# Patient Record
Sex: MALE | Race: WHITE
[De-identification: names, ages, dates, MRNs, and addresses within clinical notes are randomized per-mention and may not be internally consistent; named-entity substitution may affect disease eponyms.]

---

## 2019-09-03 ENCOUNTER — HOSPITAL ENCOUNTER (EMERGENCY)
Dept: HOSPITAL 25 - UCCORT | Age: 69
Discharge: HOME HEALTH | End: 2019-09-03
Payer: COMMERCIAL

## 2019-09-03 VITALS — DIASTOLIC BLOOD PRESSURE: 68 MMHG | SYSTOLIC BLOOD PRESSURE: 110 MMHG

## 2019-09-03 DIAGNOSIS — R10.12: Primary | ICD-10-CM

## 2019-09-03 PROCEDURE — G0463 HOSPITAL OUTPT CLINIC VISIT: HCPCS

## 2019-09-03 PROCEDURE — 99212 OFFICE O/P EST SF 10 MIN: CPT

## 2019-09-03 NOTE — UC
UC General HPI





- HPI Summary


HPI Summary: 





PT REPORTS HAVING A DISCOMFORT AND IS POINTING TO HIS LUQ X 1 WEEK.


HE DENIES ANY TYPE OF HX INVOLVING OVER USE OR INJURY.


IT RADIATES INTO HIS BACK.


NOTHING MAKES IT BETTER OR WORSE.


HE DENIES CP, NAUSEA, SWEATING AND IT DOES NOT CHANGE WITH EXERTION; HOWEVER, 

HE WANTS TO ENSURE IT IS NOT HIS HEART.


NO N/V/D OR DYSURIA AND NO CHANGES WITH MEALS.





- History of Current Complaint


Chief Complaint: UCAbdominalPain


Stated Complaint: LEFT SIDE PAIN


Time Seen by Provider: 09/03/19 10:30


Hx Obtained From: Patient


Timing: Constant


Pain Intensity: 4


Associated Signs & Symptoms: Negative: Cough, Chest Pain, SOB





- Allergy/Home Medications


Allergies/Adverse Reactions: 


 Allergies











Allergy/AdvReac Type Severity Reaction Status Date / Time


 


No Known Allergies Allergy   Verified 09/03/19 10:21














PMH/Surg Hx/FS Hx/Imm Hx


Endocrine History: Dyslipidemia


Cardiovascular History: Cardiac Disease





- Surgical History


Surgical History: Yes


Surgery Procedure, Year, and Place: 2 cardiac stents.  heel surgery; back 4/14





- Family History


Known Family History: Positive: None





- Social History


Alcohol Use: None


Substance Use Type: None


Smoking Status (MU): Never Smoked Tobacco





Review of Systems


All Other Systems Reviewed And Are Negative: Yes


Constitutional: Negative: Fever, Chills


Skin: Negative: Rash


Respiratory: Negative: Shortness Of Breath, Cough


Cardiovascular: Negative: Palpitations, Chest Pain


Gastrointestinal: Positive: Abdominal Pain.  Negative: Vomiting, Diarrhea, 

Nausea


Genitourinary: Negative: Dysuria





Physical Exam


Triage Information Reviewed: Yes


Appearance: Well-Appearing


Vital Signs: 


 Initial Vital Signs











Temp  97.7 F   09/03/19 10:12


 


Pulse  57   09/03/19 10:12


 


Resp  16   09/03/19 10:12


 


BP  110/68   09/03/19 10:12


 


Pulse Ox  99   09/03/19 10:12











Vital Signs Reviewed: Yes


Eyes: Positive: Conjunctiva Clear


ENT: Positive: Normal ENT inspection


Neck: Positive: Supple, Nontender, No Lymphadenopathy


Respiratory: Positive: Lungs clear, Normal breath sounds, No respiratory 

distress, Other: - CHEST HAS NO RASH


Cardiovascular: Positive: RRR, No Murmur, Pulses Normal - BUE'S


Abdomen Description: Positive: No Organomegaly, Soft, Other: - NO RASH. TENDER 

LUQ.  Negative: Bruit, CVA Tenderness (R), CVA Tenderness (L), Distended, 

Guarding, Pulsatile Mass


Bowel Sounds: Positive: Present


Musculoskeletal: Positive: ROM Intact


Neurological: Positive: Alert


Psychological: Positive: Age Appropriate Behavior


Skin Exam: Normal


Skin: Negative: Rashes





Course/Dx





- Course


Course Of Treatment: 





PT DECLINING EKG CITING WILL HAVE IT DONE BY THE ER.





REPORT GIVEN TO DEO FAJARDO AT [facility]. ADVISED OF LUQ PAIN INTO 

BACK. DRIVING SELF. DECLINED EKG HERE.





- Differential Dx - Multi-Symptom


Differential Diagnoses: Other - DOUBT CARDIAC BUT STILL POSSIBLE. LOW CONCERN 

FOR PUMONARY ISSUE. COULD BE HEPATOBILIARY, PANCREATIC OR BOWEL AS WELL. DOUBT 

URINARY. PT AGREES TO ER TRANSFER FOR EVALUATION. HE WANTS TO DRIVE HIMSELF.





- Diagnoses


Provider Diagnosis: 


 LUQ discomfort








Discharge ED





- Sign-Out/Discharge


Documenting (check all that apply): Patient Departure


All imaging exams completed and their final reports reviewed: No Studies





- Discharge Plan


Condition: Stable


Disposition: TRANS HIGHER LVL OF CARE FAC


Referrals: 


Feliz Jiang MD [Primary Care Provider] - 


Additional Instructions: 


LEAVE HERE AND GO DIRECTLY TO THE [facility] ER AS DISCUSSED





- Billing Disposition and Condition


Condition: STABLE


Disposition: Trans Higher Lvl of Care Fac

## 2019-09-03 NOTE — XMS REPORT
Continuity of Care Document (CCD)

 Created on:2019



Patient:Az Solares

Sex:Male

:1950

External Reference #:[id number]





Demographics







 Address  [address] [place] [address]

 

 Mobile Phone  [phone]

 

 Preferred Language  en

 

 Marital Status  Declined to Specify/Unknown

 

 Mu-ism Affiliation  Unknown

 

 Race  White

 

 Ethnic Group  Declined to Specify/Unknown









Author







 Name  Feliz Jiang (transmitted by agent of provider Jyoti Whaley)

 

 Address  [address] [place] [address]









Problems







 Active Problems  Provider  Date

 

 Congenital insufficiency of mitral valve  Feliz Jiang  Onset: 01/10/2006

 

 Hyperlipidemia  Feliz Jiang  Onset: 01/10/2006

 

 Mitral valve disorder  Feliz Jiang  Onset: 01/10/2006

 

 Palpitations  Feliz Jiang  Onset: 01/10/2006

 

 Carotid artery occlusion  Feliz Jiang  Onset: 01/10/2006

 

 Chest pain  Feliz Jiang  Onset: 10/24/2006







Social History







 Type  Date  Description  Comments

 

 Birth Sex    Unknown  

 

 Tobacco Use  Start: Unknown  Denies Smoking  

 

 ETOH Use    Denies alcohol use  

 

 Recreational Drug Use    Denies Drug Use  

 

 Tobacco Use  Start: Unknown  Patient has never smoked  

 

 Smoking Status  Reviewed: 17  Patient has never smoked  







Allergies, Adverse Reactions, Alerts







 Description

 

 No Known Drug Allergies







Medications







 Active Medications  SIG  Qnty  Indications  Ordering Provider  Date

 

 Vitamin D3  1 by mouth  100caps    Feliz Jiang  2016



          5000Unit  every day        



 Capsules          



           

 

 Calcium 600 High  1 po bid  100tabs    Feliz Jiang  2016



 Potency          



       600mg Tablets          



           

 

 Aspir-Low  1 by mouth  100tabs    Feliz Jiang  2016



         81mg Tablets  every day        



 DR          

 

 Lipitor  1 PO Q Day  90tabs    Feliz Jiang  2012



       40mg Tablets          



           

 

 Carvedilol  1 po bid  180tabs    Feliz Jiang  2010



          3.125mg          



 Tablets          



           

 

 Tums  prn      Feliz Jiang  2006



    200mg Chewtabs          



           







Immunizations







 CPT Code  Status  Date  Vaccine  Lot #

 

   Given  2017  Influenza Virus (Afluria) Split Virus 3 Years Of  



       Age And Older  

 

 13059  Given  2016  Pneumococcal Conjugate Vaccine 13 Valent For  [id number]



       Intramuscular Use  

 

 46651  Given  2006  Tetanus And Diptheria Toxiods  

 

 99891  Given  2006  DT Immunization DIP/Tet (History Only)  [id number]

 

 37933  Given  2004  Tetanus And Diptheria Toxiods  







Vital Signs







 Date  Vital  Result  Comment

 

 2019 10:53am  BP Systolic  112 mmHg  









 BP Diastolic  62 mmHg  

 

 Heart Rate  52 /min  

 

 Height  67 inches  5'7"

 

 Weight  204.00 lb  

 

 BMI (Body Mass Index)  31.9 kg/m2  

 

 O2 % BldC Oximetry  96 %  









 2019 10:00am  BP Systolic  112 mmHg  









 BP Diastolic  70 mmHg  

 

 Heart Rate  62 /min  

 

 Height  67 inches  5'7"

 

 Weight  208.00 lb  

 

 BMI (Body Mass Index)  32.6 kg/m2  

 

 O2 % BldC Oximetry  97 %  







Results







 Test  Date  Facility  Test  Result  H/L  Range  Note

 

 Lipid Profile  2019  [facility]  Triglycerides  171 mg/dL      
1



 (Trig/Chol/HDL)    [address] [place] [address] [phone]          









 Cholesterol  138 mg/dL      2

 

 HDL Cholesterol  44.7 mg/dL      3

 

 LDL Cholesterol  59 mg/dL      4









 Comp Metabolic  2019  [facility]  Sodium  138 mmol/L  Normal  
135-145  



 Panel    [address] [place] [address] [phone]          









 Potassium  3.9 mmol/L  Normal  3.5-5.0  

 

 Chloride  104 mmol/L  Normal  101-111  

 

 Co2 Carbon Dioxide  27 mmol/L  Normal  22-32  

 

 Anion Gap  7 mmol/L  Normal  2-11  

 

 Glucose  114 mg/dL  High    

 

 Blood Urea Nitrogen  16 mg/dL  Normal  6-24  

 

 Creatinine  0.97 mg/dL  Normal  0.67-1.17  

 

 BUN/Creatinine Ratio  16.5  Normal  8-20  

 

 Calcium  9.8 mg/dL  Normal  8.6-10.3  

 

 Total Protein  6.2 g/dL  Low  6.4-8.9  

 

 Albumin  4.1 g/dL  Normal  3.2-5.2  

 

 Globulin  2.1 g/dL  Normal  2-4  

 

 Albumin/Globulin Ratio  2.0  Normal  1-3  

 

 Total Bilirubin  0.80 mg/dL  Normal  0.2-1.0  

 

 Alkaline Phosphatase  117 U/L  High    

 

 Alt  19 U/L  Normal  7-52  

 

 Ast  20 U/L  Normal  13-39  

 

 Egfr Non-  76.7    >60  

 

 Egfr   92.9    >60  5









 CBC Auto  2019  [facility]  White Blood  5.7 10^3/uL  Normal  
3.5-10.8  



 Diff    [address]  Count        



     [place] [address] [phone]          









 Red Blood Count  5.07 10^6/uL  Normal  4.18-5.48  

 

 Hemoglobin  15.1 g/dL  Normal  14.0-18.0  

 

 Hematocrit  44 %  Normal  36-46  

 

 Mean Corpuscular Volume  86 fL  Normal  80-94  

 

 Mean Corpuscular Hemoglobin  30 pg  Normal  27-31  

 

 Mean Corpuscular HGB Conc  35 g/dL  Normal  31-36  

 

 Red Cell Distribution Width  14 %  Normal  10.5-15  

 

 Platelet Count  128 10^3/uL  Low  150-450  

 

 Mean Platelet Volume  9.2 fL  Normal  7.4-10.4  

 

 Abs Neutrophils  3.2 10^3/uL  Normal  1.5-7.7  

 

 Abs Lymphocytes  1.8 10^3/uL  Normal  1.0-4.8  

 

 Abs Monocytes  0.5 10^3/uL  Normal  0-0.8  

 

 Abs Eosinophils  0.1 10^3/uL  Normal  0-0.6  

 

 Abs Basophils  0 10^3/uL  Normal  0-0.2  

 

 Abs Nucleated RBC  0 10^3/uL      

 

 Granulocyte %  56.3 %      

 

 Lymphocyte %  32.1 %      

 

 Monocyte %  9.2 %      

 

 Eosinophil %  1.7 %      

 

 Basophil %  0.7 %      

 

 Nucleated Red Blood Cells %  0      









 Comp Metabolic  2019  [facility]  Sodium  139 mmol/L  Normal  
135-145  



 Panel    [address] [place] [address] [phone]          









 Potassium  4.3 mmol/L  Normal  3.5-5.0  

 

 Chloride  106 mmol/L  Normal  101-111  

 

 Co2 Carbon Dioxide  26 mmol/L  Normal  22-32  

 

 Anion Gap  7 mmol/L  Normal  2-11  

 

 Glucose  118 mg/dL  High    

 

 Blood Urea Nitrogen  19 mg/dL  Normal  6-24  

 

 Creatinine  0.95 mg/dL  Normal  0.67-1.17  

 

 BUN/Creatinine Ratio  20.0  Normal  8-20  

 

 Calcium  9.6 mg/dL  Normal  8.6-10.3  

 

 Total Protein  6.2 g/dL  Low  6.4-8.9  

 

 Albumin  4.1 g/dL  Normal  3.2-5.2  

 

 Globulin  2.1 g/dL  Normal  2-4  

 

 Albumin/Globulin Ratio  2.0  Normal  1-3  

 

 Total Bilirubin  0.80 mg/dL  Normal  0.2-1.0  

 

 Alkaline Phosphatase  106 U/L  High    

 

 Alt  23 U/L  Normal  7-52  

 

 Ast  23 U/L  Normal  13-39  

 

 Egfr Non-  78.6    >60  

 

 Egfr   95.1    >60  6









 Lipid Profile  2019  [facility]  Triglycerides  148 mg/dL      
7



 (Trig/Chol/HDL)    [address]          



     [place] [address]          



     [phone]          









 Cholesterol  138 mg/dL      8

 

 HDL Cholesterol  46.0 mg/dL      9

 

 LDL Cholesterol  62 mg/dL      10









 Laboratory test  2019  [facility]  Vitamin D  35.0 ng/mL  
Normal  20-50  11



 finding    [address]  Total [address])        



     [place] [address]          



     [phone]          

 

 PSA Free And  2019  [facility]  PSA Total  <0.10    <=4.5  



 Total    [address]    ng/mL      



     [place] [address]          



     [phone]          









 PSA Free  <0.1 ng/mL      

 

 PSA Free/Total  See Comment ratio      12









 Testosterone  2019  [facility]  Free  7.38    3.47-13.0  13



 Free & Total    [address]  Testosterone  ng/dL      



     [place] [address]  ng/dl        



     [phone]          









 Testosterone  238 ng/dL  Abnormal  240-950  14









 .TSH+Free T4  2019  [facility]  TSH (Thyroid  1.84  Normal  
0.34-5.60  



 ([facility]    [address]  Stim Horm)  mcIU/mL      



 [facility] [place] [address] [phone]          









 Free T4 (Free Thyroxine)  <pending>      









 Laboratory test  2019  [facility]  Thyroxine  6.19  Normal  
6.09-12.23  



 finding    [address]    g/dL      



     [place] [address] [phone]          









 1  Desirable: <150



   Borderline High: 150-199



   High: 200-499



   Very High: >500

 

 2  Desirable: <200



   Borderline High: 200-239



   High: >239

 

 3  Low: <40



   Desirable: 40-60



   High: >60

 

 4  Desirable: <100



   Near Optimal: 100-129



   Borderline High: 130-159



   High: 160-189



   Very High: >189

 

 5  *******Because ethnic data is not always readily available,



   this report includes an eGFR for both -Americans and



   non- Americans.****



   The National Kidney Disease Education Program (NKDEP) does



   not endorse the use of the MDRD equation for patients that



   are not between the ages of 18 and 70, are pregnant, have



   extremes of body size, muscle mass, or nutritional status,



   or are non- or non-.



   According to the National Kidney Foundation, irrespective of



   diagnosis, the stage of the disease is based on the level of



   kidney function:



   Stage Description                      GFR(mL/min/1.73 m(2))



   1     Kidney damage with normal or decreased GFR       90



   2     Kidney damage with mild decrease in GFR          60-89



   3     Moderate decrease in GFR                         30-59



   4     Severe decrease in GFR                           15-29



   5     Kidney failure                       <15 (or dialysis)

 

 6  *******Because ethnic data is not always readily available,



   this report includes an eGFR for both -Americans and



   non- Americans.****



   The National Kidney Disease Education Program (NKDEP) does



   not endorse the use of the MDRD equation for patients that



   are not between the ages of 18 and 70, are pregnant, have



   extremes of body size, muscle mass, or nutritional status,



   or are non- or non-.



   According to the National Kidney Foundation, irrespective of



   diagnosis, the stage of the disease is based on the level of



   kidney function:



   Stage Description                      GFR(mL/min/1.73 m(2))



   1     Kidney damage with normal or decreased GFR       90



   2     Kidney damage with mild decrease in GFR          60-89



   3     Moderate decrease in GFR                         30-59



   4     Severe decrease in GFR                           15-29



   5     Kidney failure                       <15 (or dialysis)

 

 7  Desirable: <150



   Borderline High: 150-199



   High: 200-499



   Very High: >500

 

 8  Desirable: <200



   Borderline High: 200-239



   High: >239

 

 9  Low: <40



   Desirable: 40-60



   High: >60

 

 10  Desirable: <100



   Near Optimal: 100-129



   Borderline High: 130-159



   High: 160-189



   Very High: >189

 

 11  Total 25-Hydroxyvitamin D2 and D3 (25-OH-VitD)



   



   <10 ng/mL (severe deficiency)



   



   10-19 ng/mL (mild to moderate deficiency)



   



   20-50 ng/mL (optimum levels)



   



   51-80 ng/mL (increased risk of hypercalciuria)



   



   >80 ng/mL (toxicity possible)

 

 12  Ratio was not calculated because free PSA is less



   than 0.1 ng/mL



   Ratio not calculated because clinical usefulness is not



   defined except in range of total PSA 4.0-10.0 ng/mL.



   -------------------ADDITIONAL INFORMATION-------------------



   The testing method is an electrochemiluminescence



   assay manufactured by [employer] and



   performed on the Modular or Roxann system.



   Values obtained with different assay methods or kits



   may be different and cannot be used interchangeably.



   Test results cannot be interpreted as absolute evidence



   for the presence or absence of malignant disease.



   Test Performed by:



   [facility] - [address] [address]

 

 13  -------------------ADDITIONAL INFORMATION-------------------



   Testing performed by [facility].



   This test was developed and its performance characteristics



   determined by [facility] in a manner consistent with CLIA



   requirements. This test has not been cleared or approved by



   the U.S. Food and Drug Administration.

 

 14  -------------------ADDITIONAL INFORMATION-------------------



   Testing performed by Liquid Chromatography-Tandem Mass



   Spectrometry (LC-MS/MS).



   This test was developed and its performance characteristics



   determined by [facility] in a manner consistent with CLIA



   requirements. This test has not been cleared or approved by



   the U.S. Food and Drug Administration.



   Test Performed by:



   [facility] - [address] [address]







Procedures







 Date  Code  Description  Status

 

 2019  44928  PVR-Atrerial Study  Completed

 

 2019  73807  Spirometry Graphic Record/Max Voluntary Vent  Completed

 

 2019  19580  Holter Monitor Office  Completed

 

 2019  75425  Non-Invcorrotid/Comp /Bilat Study  Completed

 

 2019  35282  Echocardiography  Completed

 

 06/15/2017  [id number]  Bone Mineral Density Test  Completed

 

 2005  [id number]  Colonoscopy  Completed







Medical Devices







 Description

 

 No Information Available







Encounters







 Type  Date  Location  Provider  Dx  Diagnosis

 

 Office Visit  2019 10:30a  Main Office  Feliz Jiang  I65.23  Occlusion 
and stenosis



           of bilateral carotid



           arteries









 E66.8  Other obesity

 

 I34.0  Nonrheumatic mitral (valve) insufficiency

 

 E78.5  Hyperlipidemia, unspecified

 

 R73.01  Impaired fasting glucose

 

 N52.01  Erectile dysfunction due to arterial insufficiency

 

 N40.0  Benign prostatic hyperplasia without lower urinry tract symp









 Office Visit  2019 10:00a  Main Office  Feliz Jiang  I65.23  Occlusion 
and stenosis



           of bilateral carotid



           arteries









 E66.8  Other obesity

 

 I34.0  Nonrheumatic mitral (valve) insufficiency

 

 E78.5  Hyperlipidemia, unspecified

 

 R73.01  Impaired fasting glucose

 

 N52.01  Erectile dysfunction due to arterial insufficiency

 

 N40.0  Benign prostatic hyperplasia without lower urinry tract symp







Assessments







 Date  Code  Description  Provider

 

 2019  I65.23  Occlusion and stenosis of bilateral carotid arteries  Gauss
, Feliz

 

 2019  E66.8  Other obesity  Gauss, Feliz

 

 2019  I34.0  Nonrheumatic mitral (valve) insufficiency  Gauss, Feliz

 

 2019  E78.5  Hyperlipidemia, unspecified  Gauss, Feliz

 

 2019  R73.01  Impaired fasting glucose  Gauss, Feliz

 

 2019  N52.01  Erectile dysfunction due to arterial insufficiency  Gauss, 
Feliz

 

 2019  N40.0  Benign prostatic hyperplasia without lower urinary  Gauss, 
Feliz



     tract symptoms  

 

 2019  I65.23  Occlusion and stenosis of bilateral carotid arteries  Gauss
, Feliz

 

 2019  E66.8  Other obesity  Gauss, Feliz

 

 2019  I34.0  Nonrheumatic mitral (valve) insufficiency  Gauss, Feliz

 

 2019  E78.5  Hyperlipidemia, unspecified  Gauss, Feliz

 

 2019  R73.01  Impaired fasting glucose  Gauss, Feliz

 

 2019  N52.01  Erectile dysfunction due to arterial insufficiency  Gauss, 
Feliz

 

 2019  N40.0  Benign prostatic hyperplasia without lower urinary  Gauss, 
Feliz



     tract symptoms  

 

 2019  R00.2  Palpitations  Gauss, Feliz

 

 2019  R06.02  Shortness of breath  Apolinar Feliz

 

 2019  J44.9  Chronic obstructive pulmonary disease, unspecified  Apolinar 
Feliz

 

 2019  R05  Cough  Feliz Jiang

 

 2019  I65.23  Occlusion and stenosis of bilateral carotid arteries  Gamagdi
 Feliz

 

 2019  I34.0  Nonrheumatic mitral (valve) insufficiency  Feliz Jiang







Plan of Treatment

Future Appointment(s):2019  8:45 am - Nurse at Main [id number] 11:
30 am - Feliz Jiang at Main [id number] - Alexi JianglI65.23 Occlusion and 
stenosis of bilateral carotid arteriesComments:Discussed role of life style 
choices in dietary fats and exercise plays on evolution of native disease and 
importance of controlling cholesterol. Medications role and side effects in 
disease progression prevention discussed and need for compliance with 
prescribed treatment. Follow up testing for progression such as ultrasound 
surveillance nggrrciaA59.8 Other uirnswtL53.0 Nonrheumatic mitral (valve) 
insufficiencyComments:Issues of symptoms and aggravating lifestyle factors such 
as sleep, stress and dietary choices discussed. Symptoms and medication 
treatment and compliance and side effects discussed as needed. Need forSBE 
prophalaxis with antibiotics addressed and discussed where indicated. Follow up 
Echocardiogram as required.Discussed valvular pathology and need if indicated 
for antibiotic prophylaxis to prevent S.B.E. Medication compliance and side 
effects issues addressed. Follow up echocardiogram as warranted.Results of 2D 
echo and other testing reviewed and discussed with patient possible etiologies, 
progression prognosis and need for prophylactic antibiotics before dental 
procedures if warranted for S.B.E. prophylaxis. Routine follow up/ surveillance 
planned.E78.5 Hyperlipidemia, unspecifiedComments:Counselled on role of diet 
and excersize and importance to keep compliance with medication if prescribed 
and side effects. The importance of routine monitoring of blood lipids and 
liver tests to managetreatment and avoid side effects were discussed. Usual 
follow up is 3 months for LFT and Lipid profile. Patient education including 
dietary guidelines and materials provided.R73.01 Impaired fasting 
glucoseComments:diet and erzprbdB53.01 Erectile dysfunction due to arterial 
insufficiencyComments:Discussed lifestyle factors including stress, exercise, 
obesity, alcohol, nicitine and dietary factors and the role they play. 
Medication use, side effects, timing, cautions and benefits discussed. Response 
to medication monitored and samples. When medications fail, suction aids 
discussed such as the ErecAid System. Discussed lifestyle factors including 
stress, exercise, obesity, alcohol, nicitine and dietary factors and the role 
they play. Medication use, side effects, timing, cautions and 
benefitsdiscussed. Response to medication monitored and samples. When 
medications fail, suction aids discussed such as the ErecAid System.  Discussed 
lifestyle factors including stress, exercise, obesity, alcohol, nicitine and 
dietary factors and the role they play. Medication use, side effects, timing, 
cautions and benefits discussed. Response to medication monitored and samples. 
When medications fail, suction aids discussed such as the ErecAid System.N40.0 
Benign prostatic hyperplasia without lower urinary tract symptomsComments:
yearly digital examyearly psaSymptoms and signs reviewed and therapy such as 
medication, vitamins, diet supplements and their risks and benefits discussed. 
Symptom progression and referral to urology as appropriate for symptom 
progression and severity discussed and ordered. The importance of medication 
compliance when appropriate discussed and the effects of other medications such 
as antihistamines and OTC cold medicines considered and discussed with patient. 
Lab work as appropriate and follow up symptomatically and with JACQUI yearly 
arrangedyearly digital examyearly psaSymptoms and signs reviewed and therapy 
such as medication, vitamins, diet supplements and their risks and benefits 
discussed. Symptom progression and referral to urology as appropriate for 
symptom progression and severity discussedand ordered. The importance of 
medication compliance when appropriate discussed and the effects of other 
medications such as antihistamines and OTC cold medicines considered and 
discussed with patient.Lab work as appropriate and follow up symptomatically 
and with JACQUI yearly arranged



Functional Status







 Description

 

 No Information Available







Mental Status







 Description

 

 No Information Available







Referrals







 Description

 

 No Information Available

## 2019-09-03 NOTE — XMS REPORT
Continuity of Care Document (CCD)

 Created on:2019



Patient:Az Solares

Sex:Male

:1950

External Reference #:[id number]





Demographics







 Address  [address] [place] [address]

 

 Mobile Phone  [phone]

 

 Preferred Language  en

 

 Marital Status  Declined to Specify/Unknown

 

 Church Affiliation  Unknown

 

 Race  White

 

 Ethnic Group  Declined to Specify/Unknown









Author







 Name  Feliz Jiang (transmitted by agent of provider Caren Hart)

 

 Address  [address] [place] [address]









Problems







 Active Problems  Provider  Date

 

 Congenital insufficiency of mitral valve  Feliz Jiang  Onset: 01/10/2006

 

 Hyperlipidemia  Feliz Jiang  Onset: 01/10/2006

 

 Mitral valve disorder  Feliz Jiang  Onset: 01/10/2006

 

 Palpitations  Feliz Jiang  Onset: 01/10/2006

 

 Carotid artery occlusion  Feliz Jiang  Onset: 01/10/2006

 

 Chest pain  Feliz Jiang  Onset: 10/24/2006







Social History







 Type  Date  Description  Comments

 

 Birth Sex    Unknown  

 

 Tobacco Use  Start: Unknown  Denies Smoking  

 

 ETOH Use    Denies alcohol use  

 

 Recreational Drug Use    Denies Drug Use  

 

 Tobacco Use  Start: Unknown  Patient has never smoked  

 

 Smoking Status  Reviewed: 17  Patient has never smoked  







Allergies, Adverse Reactions, Alerts







 Description

 

 No Known Drug Allergies







Medications







 Active Medications  SIG  Qnty  Indications  Ordering Provider  Date

 

 Vitamin D3  1 by mouth  100caps    Feliz Jiang  2016



          5000Unit  every day        



 Capsules          



           

 

 Calcium 600 High  1 po bid  100tabs    Feliz Jiang  2016



 Potency          



       600mg Tablets          



           

 

 Aspir-Low  1 by mouth  100tabs    Feliz Jiang  2016



         81mg Tablets  every day        



 DR          

 

 Lipitor  1 PO Q Day  90tabs    Feliz Jiang  2012



       40mg Tablets          



           

 

 Carvedilol  1 po bid  180tabs    Feliz Jiang  2010



          3.125mg          



 Tablets          



           

 

 Tums  prn      Feliz Jiang  2006



    200mg Chewtabs          



           







Immunizations







 CPT Code  Status  Date  Vaccine  Lot #

 

   Given  2017  Influenza Virus (Afluria) Split Virus 3 Years Of  



       Age And Older  

 

 05655  Given  2016  Pneumococcal Conjugate Vaccine 13 Valent For  [id number]



       Intramuscular Use  

 

 63926  Given  2006  Tetanus And Diptheria Toxiods  

 

 16254  Given  2006  DT Immunization DIP/Tet (History Only)  [id number]

 

 64632  Given  2004  Tetanus And Diptheria Toxiods  







Vital Signs







 Date  Vital  Result  Comment

 

 2019 10:53am  BP Systolic  112 mmHg  









 BP Diastolic  62 mmHg  

 

 Heart Rate  52 /min  

 

 Height  67 inches  5'7"

 

 Weight  204.00 lb  

 

 BMI (Body Mass Index)  31.9 kg/m2  

 

 O2 % BldC Oximetry  96 %  









 2019 10:00am  BP Systolic  112 mmHg  









 BP Diastolic  70 mmHg  

 

 Heart Rate  62 /min  

 

 Height  67 inches  5'7"

 

 Weight  208.00 lb  

 

 BMI (Body Mass Index)  32.6 kg/m2  

 

 O2 % BldC Oximetry  97 %  







Results







 Test  Date  Facility  Test  Result  H/L  Range  Note

 

 Lipid Profile  2019  [facility]  Triglycerides  171 mg/dL      
1



 (Trig/Chol/HDL)    [address] [place] [address] [phone]          









 Cholesterol  138 mg/dL      2

 

 HDL Cholesterol  44.7 mg/dL      3

 

 LDL Cholesterol  59 mg/dL      4









 Comp Metabolic  2019  [facility]  Sodium  138 mmol/L  Normal  
135-145  



 Panel    [address] [place] [address] [phone]          









 Potassium  3.9 mmol/L  Normal  3.5-5.0  

 

 Chloride  104 mmol/L  Normal  101-111  

 

 Co2 Carbon Dioxide  27 mmol/L  Normal  22-32  

 

 Anion Gap  7 mmol/L  Normal  2-11  

 

 Glucose  114 mg/dL  High    

 

 Blood Urea Nitrogen  16 mg/dL  Normal  6-24  

 

 Creatinine  0.97 mg/dL  Normal  0.67-1.17  

 

 BUN/Creatinine Ratio  16.5  Normal  8-20  

 

 Calcium  9.8 mg/dL  Normal  8.6-10.3  

 

 Total Protein  6.2 g/dL  Low  6.4-8.9  

 

 Albumin  4.1 g/dL  Normal  3.2-5.2  

 

 Globulin  2.1 g/dL  Normal  2-4  

 

 Albumin/Globulin Ratio  2.0  Normal  1-3  

 

 Total Bilirubin  0.80 mg/dL  Normal  0.2-1.0  

 

 Alkaline Phosphatase  117 U/L  High    

 

 Alt  19 U/L  Normal  7-52  

 

 Ast  20 U/L  Normal  13-39  

 

 Egfr Non-  76.7    >60  

 

 Egfr   92.9    >60  5









 CBC Auto  2019  [facility]  White Blood  5.7 10^3/uL  Normal  
3.5-10.8  



 Diff    [address]  Count        



     [place] [address] [phone]          









 Red Blood Count  5.07 10^6/uL  Normal  4.18-5.48  

 

 Hemoglobin  15.1 g/dL  Normal  14.0-18.0  

 

 Hematocrit  44 %  Normal  36-46  

 

 Mean Corpuscular Volume  86 fL  Normal  80-94  

 

 Mean Corpuscular Hemoglobin  30 pg  Normal  27-31  

 

 Mean Corpuscular HGB Conc  35 g/dL  Normal  31-36  

 

 Red Cell Distribution Width  14 %  Normal  10.5-15  

 

 Platelet Count  128 10^3/uL  Low  150-450  

 

 Mean Platelet Volume  9.2 fL  Normal  7.4-10.4  

 

 Abs Neutrophils  3.2 10^3/uL  Normal  1.5-7.7  

 

 Abs Lymphocytes  1.8 10^3/uL  Normal  1.0-4.8  

 

 Abs Monocytes  0.5 10^3/uL  Normal  0-0.8  

 

 Abs Eosinophils  0.1 10^3/uL  Normal  0-0.6  

 

 Abs Basophils  0 10^3/uL  Normal  0-0.2  

 

 Abs Nucleated RBC  0 10^3/uL      

 

 Granulocyte %  56.3 %      

 

 Lymphocyte %  32.1 %      

 

 Monocyte %  9.2 %      

 

 Eosinophil %  1.7 %      

 

 Basophil %  0.7 %      

 

 Nucleated Red Blood Cells %  0      









 Comp Metabolic  2019  [facility]  Sodium  139 mmol/L  Normal  
135-145  



 Panel    [address] [address] [phone]          









 Potassium  4.3 mmol/L  Normal  3.5-5.0  

 

 Chloride  106 mmol/L  Normal  101-111  

 

 Co2 Carbon Dioxide  26 mmol/L  Normal  22-32  

 

 Anion Gap  7 mmol/L  Normal  2-11  

 

 Glucose  118 mg/dL  High    

 

 Blood Urea Nitrogen  19 mg/dL  Normal  6-24  

 

 Creatinine  0.95 mg/dL  Normal  0.67-1.17  

 

 BUN/Creatinine Ratio  20.0  Normal  8-20  

 

 Calcium  9.6 mg/dL  Normal  8.6-10.3  

 

 Total Protein  6.2 g/dL  Low  6.4-8.9  

 

 Albumin  4.1 g/dL  Normal  3.2-5.2  

 

 Globulin  2.1 g/dL  Normal  2-4  

 

 Albumin/Globulin Ratio  2.0  Normal  1-3  

 

 Total Bilirubin  0.80 mg/dL  Normal  0.2-1.0  

 

 Alkaline Phosphatase  106 U/L  High    

 

 Alt  23 U/L  Normal  7-52  

 

 Ast  23 U/L  Normal  13-39  

 

 Egfr Non-  78.6    >60  

 

 Egfr   95.1    >60  6









 Lipid Profile  2019  [facility]  Triglycerides  148 mg/dL      
7



 (Trig/Chol/HDL)    [address]          



     [place] [address]          



     [phone]          









 Cholesterol  138 mg/dL      8

 

 HDL Cholesterol  46.0 mg/dL      9

 

 LDL Cholesterol  62 mg/dL      10









 Laboratory test  2019  [facility]  Vitamin D  35.0 ng/mL  
Normal  20-50  11



 finding    [address]  Total 25(Oh)        



     [place] [address]          



     [phone]          

 

 PSA Free And  2019  [facility]  PSA Total  <0.10    <=4.5  



 Total    [address]    ng/mL      



     [place] [address]          



     [phone]          









 PSA Free  <0.1 ng/mL      

 

 PSA Free/Total  See Comment ratio      12









 Testosterone  2019  [facility]  Free  7.38    3.47-13.0  13



 Free & Total    [address]  Testosterone  ng/dL      



     [place] [address]  ng/dl        



     [phone]          









 Testosterone  238 ng/dL  Abnormal  240-950  14









 .TSH+Free T4  2019  [facility]  TSH (Thyroid  1.84  Normal  
0.34-5.60  



 ([facility] &    [address]  Stim Horm)  mcIU/mL      



 [facility] [place] [address] [phone]          









 Free T4 (Free Thyroxine)  <pending>      









 Laboratory test  2019  [facility]  Thyroxine  6.19  Normal  
6.09-12.23  



 finding    [address]    g/dL      



     [place] [address] [phone]          









 1  Desirable: <150



   Borderline High: 150-199



   High: 200-499



   Very High: >500

 

 2  Desirable: <200



   Borderline High: 200-239



   High: >239

 

 3  Low: <40



   Desirable: 40-60



   High: >60

 

 4  Desirable: <100



   Near Optimal: 100-129



   Borderline High: 130-159



   High: 160-189



   Very High: >189

 

 5  *******Because ethnic data is not always readily available,



   this report includes an eGFR for both -Americans and



   non- Americans.****



   The National Kidney Disease Education Program (NKDEP) does



   not endorse the use of the MDRD equation for patients that



   are not between the ages of 18 and 70, are pregnant, have



   extremes of body size, muscle mass, or nutritional status,



   or are non- or non-.



   According to the National Kidney Foundation, irrespective of



   diagnosis, the stage of the disease is based on the level of



   kidney function:



   Stage Description                      GFR(mL/min/1.73 m(2))



   1     Kidney damage with normal or decreased GFR       90



   2     Kidney damage with mild decrease in GFR          60-89



   3     Moderate decrease in GFR                         30-59



   4     Severe decrease in GFR                           15-29



   5     Kidney failure                       <15 (or dialysis)

 

 6  *******Because ethnic data is not always readily available,



   this report includes an eGFR for both -Americans and



   non- Americans.****



   The National Kidney Disease Education Program (NKDEP) does



   not endorse the use of the MDRD equation for patients that



   are not between the ages of 18 and 70, are pregnant, have



   extremes of body size, muscle mass, or nutritional status,



   or are non- or non-.



   According to the National Kidney Foundation, irrespective of



   diagnosis, the stage of the disease is based on the level of



   kidney function:



   Stage Description                      GFR(mL/min/1.73 m(2))



   1     Kidney damage with normal or decreased GFR       90



   2     Kidney damage with mild decrease in GFR          60-89



   3     Moderate decrease in GFR                         30-59



   4     Severe decrease in GFR                           15-29



   5     Kidney failure                       <15 (or dialysis)

 

 7  Desirable: <150



   Borderline High: 150-199



   High: 200-499



   Very High: >500

 

 8  Desirable: <200



   Borderline High: 200-239



   High: >239

 

 9  Low: <40



   Desirable: 40-60



   High: >60

 

 10  Desirable: <100



   Near Optimal: 100-129



   Borderline High: 130-159



   High: 160-189



   Very High: >189

 

 11  Total 25-Hydroxyvitamin D2 and D3 (25-OH-VitD)



   



   <10 ng/mL (severe deficiency)



   



   10-19 ng/mL (mild to moderate deficiency)



   



   20-50 ng/mL (optimum levels)



   



   51-80 ng/mL (increased risk of hypercalciuria)



   



   >80 ng/mL (toxicity possible)

 

 12  Ratio was not calculated because free PSA is less



   than 0.1 ng/mL



   Ratio not calculated because clinical usefulness is not



   defined except in range of total PSA 4.0-10.0 ng/mL.



   -------------------ADDITIONAL INFORMATION-------------------



   The testing method is an electrochemiluminescence



   assay manufactured by [employer] and



   performed on the Modular or Roxann system.



   Values obtained with different assay methods or kits



   may be different and cannot be used interchangeably.



   Test results cannot be interpreted as absolute evidence



   for the presence or absence of malignant disease.



   Test Performed by:



   [facility] - [address] [address]

 

 13  -------------------ADDITIONAL INFORMATION-------------------



   Testing performed by [facility].



   This test was developed and its performance characteristics



   determined by [facility] in a manner consistent with CLIA



   requirements. This test has not been cleared or approved by



   the U.S. Food and Drug Administration.

 

 14  -------------------ADDITIONAL INFORMATION-------------------



   Testing performed by Liquid Chromatography-Tandem Mass



   Spectrometry (LC-MS/MS).



   This test was developed and its performance characteristics



   determined by [facility] in a manner consistent with CLIA



   requirements. This test has not been cleared or approved by



   the U.S. Food and Drug Administration.



   Test Performed by:



   [facility] - [address] [address]







Procedures







 Date  Code  Description  Status

 

 2019  23862  PVR-Atrerial Study  Completed

 

 2019  37863  Spirometry Graphic Record/Max Voluntary Vent  Completed

 

 2019  21050  Holter Monitor Office  Completed

 

 2019  97820  Non-Invcorrotid/Comp /Bilat Study  Completed

 

 2019  44513  Echocardiography  Completed

 

 06/15/2017  [id number]  Bone Mineral Density Test  Completed

 

 2005  [id number]  Colonoscopy  Completed







Medical Devices







 Description

 

 No Information Available







Encounters







 Type  Date  Location  Provider  Dx  Diagnosis

 

 Office Visit  2019 10:00a  Main Office  Feliz Jiang  I65.23  Occlusion 
and stenosis



           of bilateral carotid



           arteries









 E66.8  Other obesity

 

 I34.0  Nonrheumatic mitral (valve) insufficiency

 

 E78.5  Hyperlipidemia, unspecified

 

 R73.01  Impaired fasting glucose

 

 N52.01  Erectile dysfunction due to arterial insufficiency

 

 N40.0  Benign prostatic hyperplasia without lower urinry tract symp







Assessments







 Date  Code  Description  Provider

 

 2019  I65.23  Occlusion and stenosis of bilateral carotid arteries  Alexi Jiangl

 

 2019  E66.8  Other obesity  Alexi Jiangl

 

 2019  I34.0  Nonrheumatic mitral (valve) insufficiency  Apolinar Feliz

 

 2019  E78.5  Hyperlipidemia, unspecified  Alexi Jiangl

 

 2019  R73.01  Impaired fasting glucose  Alexi Jiangl

 

 2019  N52.01  Erectile dysfunction due to arterial insufficiency  Apolinar 
Feliz

 

 2019  N40.0  Benign prostatic hyperplasia without lower urinary  Alexi Jiangl



     tract symptoms  

 

 2019  R00.2  Palpitations  Alexi Jiangl

 

 2019  R06.02  Shortness of breath  Apolinar Feliz

 

 2019  J44.9  Chronic obstructive pulmonary disease, unspecified  Apolinar 
Feliz

 

 2019  R05  Cough  Alexi Jiangl

 

 2019  I65.23  Occlusion and stenosis of bilateral carotid arteries  Apolinar
 Feliz

 

 2019  I34.0  Nonrheumatic mitral (valve) insufficiency  Apolinar Feliz







Plan of Treatment

2019 - Alexi JianglI65.23 Occlusion and stenosis of bilateral carotid 
arteriesComments:Discussed role of life style choices in dietary fats and 
exercise plays on evolution of native disease and importance of controlling 
cholesterol. Medications role and side effects in disease progression 
prevention discussed and need for compliance with prescribed treatment. Follow 
up testing for progression such as ultrasound surveillance [id number] Other 
otxbmijM45.0 Nonrheumatic mitral (valve) insufficiencyComments:Issues of 
symptoms and aggravating lifestyle factors such as sleep, stress and dietary 
choices discussed. Symptoms and medication treatment and compliance and side 
effects discussed as needed. Need forSBE prophalaxis with antibiotics addressed 
and discussed where indicated. Follow up Echocardiogram as required.Discussed 
valvular pathology and need if indicated for antibiotic prophylaxis to prevent 
S.B.E. Medication compliance and side effects issues addressed. Follow up 
echocardiogram as warranted.Results of 2D echo and other testing reviewed and 
discussed with patient possible etiologies, progression prognosis and need for 
prophylactic antibiotics before dental procedures if warranted for S.B.E. 
prophylaxis. Routine follow up/ surveillance planned.E78.5 Hyperlipidemia, 
unspecifiedComments:Counselled on role of diet and excersize and importance to 
keep compliance with medication if prescribed and side effects. The importance 
of routine monitoring of blood lipids and liver tests to managetreatment and 
avoid side effects were discussed. Usual follow up is 3 months for LFT and 
Lipid profile. Patient education including dietary guidelines and materials 
provided.R73.01 Impaired fasting glucoseComments:diet and omsfxthU65.01 
Erectile dysfunction due to arterial insufficiencyComments:Discussed lifestyle 
factors including stress, exercise, obesity, alcohol, nicitine and dietary 
factors and the role they play. Medication use, side effects, timing, cautions 
and benefits discussed. Response to medication monitored and samples. When 
medications fail, suction aids discussed such as the ErecAid System. Discussed 
lifestyle factors including stress, exercise, obesity, alcohol, nicitine and 
dietary factors and the role they play. Medication use, side effects, timing, 
cautions and benefitsdiscussed. Response to medication monitored and samples. 
When medications fail, suction aids discussed such as the ErecAid System.  
Discussed lifestyle factors including stress, exercise, obesity, alcohol, 
nicitine and dietary factors and the role they play. Medication use, side 
effects, timing, cautions and benefits discussed. Response to medication 
monitored and samples. When medications fail, suction aids discussed such as 
the ErecAid System.N40.0 Benign prostatic hyperplasia without lower urinary 
tract symptomsComments:yearly digital examyearly psaSymptoms and signs reviewed 
and therapy such as medication, vitamins, diet supplements and their risks and 
benefits discussed. Symptom progression and referral to urology as appropriate 
for symptom progression and severity discussed and ordered. The importance of 
medication compliance when appropriate discussed and the effects of other 
medications such as antihistamines and OTC cold medicines considered and 
discussed with patient. Lab work as appropriate and follow up symptomatically 
and with JACQUI yearly arrangedyearly digital examyearly psaSymptoms and signs 
reviewed and therapy such as medication, vitamins, diet supplements and their 
risks and benefits discussed. Symptom progression and referral to urology as 
appropriate for symptom progression and severity discussedand ordered. The 
importance of medication compliance when appropriate discussed and the effects 
of other medications such as antihistamines and OTC cold medicines considered 
and discussed with patient.Lab work as appropriate and follow up 
symptomatically and with JACQUI yearly arranged



Functional Status







 Description

 

 No Information Available







Mental Status







 Description

 

 No Information Available







Referrals







 Description

 

 No Information Available

## 2019-09-05 ENCOUNTER — HOSPITAL ENCOUNTER (EMERGENCY)
Dept: HOSPITAL 25 - UCCORT | Age: 69
Discharge: HOME | End: 2019-09-05
Payer: COMMERCIAL

## 2019-09-05 VITALS — DIASTOLIC BLOOD PRESSURE: 64 MMHG | SYSTOLIC BLOOD PRESSURE: 104 MMHG

## 2019-09-05 DIAGNOSIS — I10: ICD-10-CM

## 2019-09-05 DIAGNOSIS — B02.9: Primary | ICD-10-CM

## 2019-09-05 PROCEDURE — G0463 HOSPITAL OUTPT CLINIC VISIT: HCPCS

## 2019-09-05 PROCEDURE — 99212 OFFICE O/P EST SF 10 MIN: CPT

## 2019-09-05 NOTE — UC
Skin Complaint HPI





- HPI Summary


HPI Summary: 





Pt presents with c/o painful rash to left side of abdomen/chest and back.  The 

rash is erythematous, painful, has vessicles and is slightly pruritic that 

began 1 day ago. 





- History of Current Complaint


Chief Complaint: UCSkin


Time Seen by Provider: 09/05/19 13:03


Stated Complaint: SKIN CONCERN


Hx Obtained From: Patient


Onset/Duration: Sudden Onset, Lasting Days, Still Present


Skin Exposure Onset/Duration: Days Ago


Timing: Constant


Onset Severity: Mild


Current Severity: Moderate


Pain Intensity: 4


Location: Discrete


Character: Pruritus, Redness, Raised, Painful


Aggravating Factor(s): Touch


Alleviating Factor(s): Nothing


Associated Signs & Symptoms: Positive: Rash





- Allergy/Home Medications


Allergies/Adverse Reactions: 


 Allergies











Allergy/AdvReac Type Severity Reaction Status Date / Time


 


No Known Allergies Allergy   Verified 09/05/19 12:32














PMH/Surg Hx/FS Hx/Imm Hx


Previously Healthy: Yes


Cardiovascular History: Cardiac Disease, Hypertension





- Surgical History


Surgical History: Yes


Surgery Procedure, Year, and Place: 2 cardiac stents.  heel surgery; back 4/14





- Family History


Known Family History: Positive: Cardiac Disease





- Social History


Occupation: Employed Full-time


Lives: With Family


Alcohol Use: None


Substance Use Type: None


Smoking Status (MU): Never Smoked Tobacco


Have You Smoked in the Last Year: No





- Immunization History


Vaccination Up to Date: No





Review of Systems


All Other Systems Reviewed And Are Negative: Yes


Constitutional: Positive: Negative


Skin: Positive: Rash - painful, red, vessicular


Eyes: Positive: Negative


ENT: Positive: Negative


Respiratory: Positive: Negative


Cardiovascular: Positive: Negative


Gastrointestinal: Positive: Negative


Genitourinary: Positive: Negative


Motor: Positive: Negative


Neurovascular: Positive: Negative


Musculoskeletal: Positive: Myalgia - where rash is.


Neurological: Positive: Negative


Psychological: Positive: Negative


Is Patient Immunocompromised?: No





Physical Exam


Triage Information Reviewed: Yes


Appearance: Well-Appearing


Vital Signs: 


 Initial Vital Signs











Temp  97.8 F   09/05/19 12:32


 


Pulse  66   09/05/19 12:32


 


Resp  16   09/05/19 12:32


 


BP  104/64   09/05/19 12:32


 


Pulse Ox  99   09/05/19 12:32











Vital Signs Reviewed: Yes


Eye Exam: Normal


ENT Exam: Normal


ENT: Positive: Hearing grossly normal


Dental Exam: Normal


Neck exam: Normal


Respiratory: Positive: No respiratory distress


Musculoskeletal Exam: Normal


Neurological Exam: Normal


Psychological Exam: Normal


Skin: Positive: Rashes - left lower chest/upper abdomen extedning to left mid 

back, intact vessicles, moderate erythema





Course/Dx





- Differential Diagnoses - Skin Complaint


Differential Diagnoses: Varicella Zoster





- Diagnoses


Provider Diagnosis: 


 Shingles rash








Discharge ED





- Sign-Out/Discharge


Documenting (check all that apply): Patient Departure


All imaging exams completed and their final reports reviewed: No Studies





- Discharge Plan


Condition: Stable


Disposition: HOME


Prescriptions: 


predniSONE TAB* [Deltasone 10 MG TAB*] 30 mg PO DAILY #12 tab


ValACYclovir (*) [Valtrex 1 GM(*)] 1 gm PO Q8H #21 tab


Patient Education Materials:  Shingles (ED)


Referrals: 


Feliz Jiang MD [Primary Care Provider] - If Needed





- Billing Disposition and Condition


Condition: STABLE


Disposition: Home